# Patient Record
Sex: FEMALE | Race: WHITE | NOT HISPANIC OR LATINO | Employment: OTHER | URBAN - METROPOLITAN AREA
[De-identification: names, ages, dates, MRNs, and addresses within clinical notes are randomized per-mention and may not be internally consistent; named-entity substitution may affect disease eponyms.]

---

## 2017-09-08 ENCOUNTER — DOCUMENTATION ONLY (OUTPATIENT)
Dept: FAMILY MEDICINE | Facility: CLINIC | Age: 37
End: 2017-09-08

## 2017-09-08 NOTE — PROGRESS NOTES
Health Maintenance Due   Topic Date Due    Lipid Panel  1982    TETANUS VACCINE  09/13/2000    Pap Smear with HPV Cotest  09/13/2003    Influenza Vaccine  08/01/2017

## 2017-11-03 RX ORDER — CITALOPRAM 40 MG/1
40 TABLET, FILM COATED ORAL DAILY
COMMUNITY
End: 2018-04-09

## 2017-11-03 RX ORDER — CLONAZEPAM 1 MG/1
1 TABLET ORAL 3 TIMES DAILY
COMMUNITY
End: 2018-04-09

## 2018-04-09 ENCOUNTER — HOSPITAL ENCOUNTER (EMERGENCY)
Facility: HOSPITAL | Age: 38
Discharge: HOME | End: 2018-04-09
Attending: EMERGENCY MEDICINE
Payer: MEDICARE

## 2018-04-09 VITALS
HEART RATE: 90 BPM | RESPIRATION RATE: 18 BRPM | BODY MASS INDEX: 22.62 KG/M2 | OXYGEN SATURATION: 100 % | SYSTOLIC BLOOD PRESSURE: 144 MMHG | WEIGHT: 132.5 LBS | HEIGHT: 64 IN | DIASTOLIC BLOOD PRESSURE: 87 MMHG | TEMPERATURE: 98 F

## 2018-04-09 DIAGNOSIS — R51.9 NONINTRACTABLE HEADACHE, UNSPECIFIED CHRONICITY PATTERN, UNSPECIFIED HEADACHE TYPE: Primary | ICD-10-CM

## 2018-04-09 PROCEDURE — 96361 HYDRATE IV INFUSION ADD-ON: CPT

## 2018-04-09 PROCEDURE — 96365 THER/PROPH/DIAG IV INF INIT: CPT

## 2018-04-09 PROCEDURE — 25000003 PHARM REV CODE 250: Performed by: EMERGENCY MEDICINE

## 2018-04-09 PROCEDURE — 63600175 PHARM REV CODE 636 W HCPCS: Performed by: EMERGENCY MEDICINE

## 2018-04-09 PROCEDURE — 99284 EMERGENCY DEPT VISIT MOD MDM: CPT

## 2018-04-09 PROCEDURE — 96375 TX/PRO/DX INJ NEW DRUG ADDON: CPT

## 2018-04-09 RX ORDER — BUTALBITAL, ACETAMINOPHEN AND CAFFEINE 50; 325; 40 MG/1; MG/1; MG/1
1 TABLET ORAL EVERY 4 HOURS PRN
Qty: 15 TABLET | Refills: 0 | Status: SHIPPED | OUTPATIENT
Start: 2018-04-09 | End: 2018-05-09

## 2018-04-09 RX ORDER — KETOROLAC TROMETHAMINE 30 MG/ML
30 INJECTION, SOLUTION INTRAMUSCULAR; INTRAVENOUS
Status: COMPLETED | OUTPATIENT
Start: 2018-04-09 | End: 2018-04-09

## 2018-04-09 RX ORDER — NAPROXEN 375 MG/1
375 TABLET ORAL 2 TIMES DAILY WITH MEALS
Qty: 30 TABLET | Refills: 0 | Status: SHIPPED | OUTPATIENT
Start: 2018-04-09

## 2018-04-09 RX ORDER — ONDANSETRON 2 MG/ML
4 INJECTION INTRAMUSCULAR; INTRAVENOUS
Status: DISCONTINUED | OUTPATIENT
Start: 2018-04-09 | End: 2018-04-09

## 2018-04-09 RX ADMIN — PROMETHAZINE HYDROCHLORIDE 12.5 MG: 25 INJECTION INTRAMUSCULAR; INTRAVENOUS at 06:04

## 2018-04-09 RX ADMIN — KETOROLAC TROMETHAMINE 30 MG: 30 INJECTION, SOLUTION INTRAMUSCULAR; INTRAVENOUS at 06:04

## 2018-04-09 RX ADMIN — SODIUM CHLORIDE 1000 ML: 0.9 INJECTION, SOLUTION INTRAVENOUS at 06:04

## 2018-04-09 NOTE — ED PROVIDER NOTES
Encounter Date: 4/9/2018       History     Chief Complaint   Patient presents with    Migraine     With nausea and vomiting      The history is provided by the patient.   Migraine   This is a recurrent problem. The current episode started 2 days ago. The problem occurs constantly. The problem has not changed since onset.Associated symptoms include headaches. Pertinent negatives include no chest pain, no abdominal pain and no shortness of breath. Nothing aggravates the symptoms. She has tried nothing for the symptoms.     Review of patient's allergies indicates:   Allergen Reactions    Ativan [lorazepam] Other (See Comments)     Hallucinations    Penicillins Anaphylaxis     Past Medical History:   Diagnosis Date    Asthma     Borderline personality disorder     Depression     PTSD (post-traumatic stress disorder)      Past Surgical History:   Procedure Laterality Date    EYE SURGERY      OOPHORECTOMY Bilateral     OVARIAN CYST REMOVAL      Left ovary removed     Family History   Problem Relation Age of Onset    Cancer Father     Diabetes Father     Hypertension Father     Dementia Maternal Grandmother     Cancer Maternal Grandfather     Cancer Paternal Grandmother     Depression Paternal Grandfather     Diabetes Paternal Grandfather     Hypertension Paternal Grandfather      Social History   Substance Use Topics    Smoking status: Current Every Day Smoker     Packs/day: 0.50    Smokeless tobacco: Never Used    Alcohol use No     Review of Systems   Constitutional: Negative for fever.   HENT: Negative for sore throat.    Respiratory: Negative for shortness of breath.    Cardiovascular: Negative for chest pain.   Gastrointestinal: Negative for abdominal pain and nausea.   Genitourinary: Negative for dysuria.   Musculoskeletal: Negative for back pain.   Skin: Negative for rash.   Neurological: Positive for headaches. Negative for weakness.   Hematological: Does not bruise/bleed easily.        Physical Exam     Initial Vitals [04/09/18 0546]   BP Pulse Resp Temp SpO2   (!) 144/86 82 19 97.6 °F (36.4 °C) 100 %      MAP       105.33         Physical Exam    Nursing note and vitals reviewed.  Constitutional: She appears well-developed and well-nourished. No distress.   HENT:   Head: Normocephalic and atraumatic.   Mouth/Throat: Oropharynx is clear and moist.   Eyes: Conjunctivae and EOM are normal. Pupils are equal, round, and reactive to light.   Neck: Normal range of motion. Neck supple.   Cardiovascular: Normal rate, regular rhythm and normal heart sounds. Exam reveals no gallop and no friction rub.    No murmur heard.  Pulmonary/Chest: Breath sounds normal. No respiratory distress. She has no wheezes. She has no rhonchi. She has no rales.   Abdominal: Soft. Bowel sounds are normal. She exhibits no distension and no mass. There is no tenderness. There is no rebound and no guarding.   Musculoskeletal: Normal range of motion. She exhibits no edema or tenderness.   Neurological: She is alert and oriented to person, place, and time. She has normal strength.   Skin: Skin is warm and dry. No rash noted.   Psychiatric: She has a normal mood and affect. Thought content normal.         ED Course   Procedures  Labs Reviewed - No data to display     6:43 AM: Reassessed pt at this time.  Pt states her condition has improved at this time. Discussed with pt all pertinent ED information and results. Discussed pt dx of headache and plan of tx. Gave pt all f/u and return to the ED instructions. All questions and concerns were addressed at this time. Pt expresses understanding of information and instructions, and is comfortable with plan to discharge. Pt is stable for discharge.                              Clinical Impression:       ICD-10-CM ICD-9-CM   1. Nonintractable headache, unspecified chronicity pattern, unspecified headache type R51 784.0         Disposition:   Disposition: Discharged  Condition:  Stable                        Joel Waston MD  04/09/18 0643

## 2018-07-07 ENCOUNTER — HOSPITAL ENCOUNTER (EMERGENCY)
Facility: HOSPITAL | Age: 38
Discharge: HOME | End: 2018-07-07
Attending: EMERGENCY MEDICINE
Payer: MEDICARE

## 2018-07-07 VITALS
BODY MASS INDEX: 21.46 KG/M2 | RESPIRATION RATE: 18 BRPM | DIASTOLIC BLOOD PRESSURE: 69 MMHG | SYSTOLIC BLOOD PRESSURE: 119 MMHG | HEART RATE: 99 BPM | TEMPERATURE: 98 F | OXYGEN SATURATION: 100 % | WEIGHT: 125 LBS

## 2018-07-07 DIAGNOSIS — K02.9 DENTAL CARIES: Primary | ICD-10-CM

## 2018-07-07 PROCEDURE — 99282 EMERGENCY DEPT VISIT SF MDM: CPT

## 2018-07-07 RX ORDER — CLINDAMYCIN HYDROCHLORIDE 150 MG/1
150 CAPSULE ORAL EVERY 6 HOURS
Qty: 28 CAPSULE | Refills: 0 | Status: SHIPPED | OUTPATIENT
Start: 2018-07-07 | End: 2018-07-14

## 2018-07-07 NOTE — ED NOTES
Denies complaints. Presents to ED for a prescription for Clindamycin. States she is taking the medication in preparation for dental removal. Pt denies complaints.

## 2018-07-07 NOTE — ED PROVIDER NOTES
Encounter Date: 7/7/2018    SCRIBE #1 NOTE: I, Evelin Edwards, am scribing for, and in the presence of, Dr. An.       History     Chief Complaint   Patient presents with    Medication Refill     Visiting from out of town. States she left her abx rx at home. Clindamycin 100mg q 6 hours.        07/07/2018  8:49 AM    Chief Complaint: Medication Refill      The patient is a 37 y.o. female who presents for medication refill. Pt states she is visiting from out of town and forgot her antibiotic at home. She was taking Clindamycin 100 mg every 6 hours for her tooth extraction scheduled 5 days from now. She has not had a reaction to the medication. No exacerbating or alleviating factors. Denies fever. PMHx of Asthma; Borderline personality disorder; Depression; and PTSD. PSHx of Ovarian cyst removal; Eye surgery; and Oophorectomy.      The history is provided by the patient.     Review of patient's allergies indicates:   Allergen Reactions    Ativan [lorazepam] Other (See Comments)     Hallucinations    Penicillins Anaphylaxis     Past Medical History:   Diagnosis Date    Asthma     Borderline personality disorder     Depression     PTSD (post-traumatic stress disorder)      Past Surgical History:   Procedure Laterality Date    EYE SURGERY      OOPHORECTOMY Bilateral     OVARIAN CYST REMOVAL      Left ovary removed     Family History   Problem Relation Age of Onset    Cancer Father     Diabetes Father     Hypertension Father     Dementia Maternal Grandmother     Cancer Maternal Grandfather     Cancer Paternal Grandmother     Depression Paternal Grandfather     Diabetes Paternal Grandfather     Hypertension Paternal Grandfather      Social History   Substance Use Topics    Smoking status: Current Every Day Smoker     Packs/day: 0.50    Smokeless tobacco: Never Used    Alcohol use No     Review of Systems   Constitutional: Negative for fever.   HENT: Positive for dental problem.    Hematological:  Negative for adenopathy.       Physical Exam     Initial Vitals [07/07/18 0829]   BP Pulse Resp Temp SpO2   119/69 99 18 98 °F (36.7 °C) 100 %      MAP       --         Physical Exam    Nursing note and vitals reviewed.  Constitutional: She appears well-developed and well-nourished.   HENT:   Head: Normocephalic and atraumatic.   Mouth/Throat: Dental caries present.   Dental caries left upper.   Eyes: EOM are normal.   Neck: Normal range of motion. Neck supple.   Pulmonary/Chest: No respiratory distress.   Musculoskeletal: Normal range of motion.   Neurological: She is alert and oriented to person, place, and time.   Skin: Skin is warm and dry.   Psychiatric: She has a normal mood and affect. Her behavior is normal. Judgment and thought content normal.         ED Course   Procedures  Labs Reviewed - No data to display       Imaging Results    None          Medical Decision Making:   History:   Old Medical Records: I decided to obtain old medical records.            Scribe Attestation:   Scribe #1: I performed the above scribed service and the documentation accurately describes the services I performed. I attest to the accuracy of the note.    I, Dr. An, personally performed the services described in this documentation. All medical record entries made by the scribe were at my direction and in my presence.  I have reviewed the chart and agree that the record reflects my personal performance and is accurate and complete.11:14 AM 07/07/2018               Clinical Impression:   The encounter diagnosis was Dental caries.      Disposition:   Disposition: Discharged  Condition: Stable         Patient here for refill of clindamycin for her dental caries, she forgot her medication in [place].  No sign of systemic illness.  No abscess on exam.  No fevers.  Prescription for clindamycin was written.               Omer An MD  07/07/18 8214

## 2019-07-19 ENCOUNTER — HOSPITAL ENCOUNTER (EMERGENCY)
Facility: HOSPITAL | Age: 39
Discharge: HOME | End: 2019-07-19
Attending: EMERGENCY MEDICINE
Payer: MEDICARE

## 2019-07-19 VITALS
HEIGHT: 64 IN | BODY MASS INDEX: 22.2 KG/M2 | WEIGHT: 130 LBS | HEART RATE: 99 BPM | SYSTOLIC BLOOD PRESSURE: 120 MMHG | TEMPERATURE: 98 F | DIASTOLIC BLOOD PRESSURE: 72 MMHG | OXYGEN SATURATION: 100 % | RESPIRATION RATE: 16 BRPM
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
## 2019-07-19 LAB
B-HCG UR QL: NEGATIVE
CTP QC/QA: YES

## 2019-07-19 PROCEDURE — 81025 URINE PREGNANCY TEST: CPT | Performed by: NURSE PRACTITIONER

## 2019-07-19 PROCEDURE — 99283 EMERGENCY DEPT VISIT LOW MDM: CPT | Mod: 25

## 2019-07-19 RX ORDER — SULFAMETHOXAZOLE AND TRIMETHOPRIM 800; 160 MG/1; MG/1
2 TABLET ORAL 2 TIMES DAILY
Qty: 14 TABLET | Refills: 0 | Status: SHIPPED | OUTPATIENT
Start: 2019-07-19 | End: 2019-07-26

## 2019-07-19 NOTE — ED PROVIDER NOTES
Encounter Date: 7/19/2019       History     Chief Complaint   Patient presents with    Insect Bite     pt reports insect bite on R elbow with increased reddness today     Patient is a 38 y.o. female who presents to the ED 07/19/2019 with a chief complaint of insect bite that occurred yesterday.  Patient states she did not feel what better.  She is unsure with better.  She states she noted a lesion on her right elbow this morning and she picked it and it drained some clear drainage. She states she noted some worsening redness and presented to the emergency department.  She has a history of PTSD and a single ovary removal.  Otherwise no medical history.  She denies any fever, vomiting, chills.              Review of patient's allergies indicates:   Allergen Reactions    Ativan [lorazepam] Other (See Comments)     Hallucinations    Penicillins Anaphylaxis     Past Medical History:   Diagnosis Date    Asthma     Borderline personality disorder     Depression     PTSD (post-traumatic stress disorder)      Past Surgical History:   Procedure Laterality Date    EYE SURGERY      OOPHORECTOMY Bilateral     OVARIAN CYST REMOVAL      Left ovary removed     Family History   Problem Relation Age of Onset    Cancer Father     Diabetes Father     Hypertension Father     Dementia Maternal Grandmother     Cancer Maternal Grandfather     Cancer Paternal Grandmother     Depression Paternal Grandfather     Diabetes Paternal Grandfather     Hypertension Paternal Grandfather      Social History     Tobacco Use    Smoking status: Current Every Day Smoker     Packs/day: 0.50    Smokeless tobacco: Never Used   Substance Use Topics    Alcohol use: No    Drug use: No     Comment: Per OB THC use, pt denies use on 12/22/14     Review of Systems   Constitutional: Negative for chills and fever.   Respiratory: Negative for chest tightness and shortness of breath.    Cardiovascular: Negative for chest pain.   Gastrointestinal:  Positive for nausea. Negative for abdominal pain and vomiting.   Musculoskeletal: Negative for arthralgias and myalgias.   Skin: Positive for wound. Negative for rash.   Neurological: Negative for weakness and numbness.   Hematological: Negative for adenopathy.       Physical Exam     Initial Vitals [07/19/19 0936]   BP Pulse Resp Temp SpO2   120/72 99 16 98.3 °F (36.8 °C) 100 %      MAP       --         Physical Exam    Nursing note and vitals reviewed.  Constitutional: Vital signs are normal. She appears well-developed and well-nourished.   HENT:   Head: Normocephalic and atraumatic.   Eyes: Pupils are equal, round, and reactive to light.   Neck: Neck supple.   Cardiovascular: Normal rate, regular rhythm, normal heart sounds and intact distal pulses. Exam reveals no gallop and no friction rub.    No murmur heard.  Pulmonary/Chest: Breath sounds normal. She has no wheezes. She has no rhonchi. She has no rales.   Abdominal: Soft. Normal appearance and bowel sounds are normal. There is no tenderness.   Musculoskeletal:        Right elbow: She exhibits swelling. She exhibits normal range of motion, no effusion, no deformity and no laceration. Tenderness found. Olecranon process tenderness noted. No radial head, no medial epicondyle and no lateral epicondyle tenderness noted.        Right wrist: Normal.   Neurological: She is alert and oriented to person, place, and time. She has normal strength.   Skin: Skin is warm, dry and intact.   Patient has small 1 x 1 cm area of erythema with central opening with serous drainage.    Psychiatric: She has a normal mood and affect. Her speech is normal and behavior is normal.             ED Course   Procedures  Labs Reviewed   POCT URINE PREGNANCY          Imaging Results    None          Medical Decision Making:   Differential Diagnosis:   Insect bite  Cellulitis  abscess       APC / Resident Notes:   Patient is a 38 y.o. female who presents to the ED 07/19/2019 who underwent  emergent evaluation to insect bite to right elbow.  Patient does not recall being bitten and does not know what bit her.  Right elbow with a small area of erythema with central serous drainage. I do not think abscess.  Patient has normal range of motion of her elbow and no pain out of proportion.  I do not think septic joint.  There is no eschar tissue or calor.  Her physical exam is otherwise unremarkable and she is very well-appearing.  She does not appear septic or toxic.  Her vital signs are normal. Patient appears to have cellulitis.  I do not think abscess.  The wound site appears to already be draining.  Patient is offered tetanus in the emergency department but leaves prior to getting tetanus immunization.  She is given a prescription for antibiotics and instructed to follow up closely with the PCP for a wound check.  She is given to defer referrals for PCP per her request.  She is instructed she can follow up in the emergency department is unable to make an appointment with PCP for wound check.  Patient verbalized understanding. Based on my clinical evaluation, I do not appreciate any immediate, emergent, or life threatening condition or etiology that warrants additional workup today and feel that the patient can be discharged with close follow up care. Case discussed with Dr. Lombardo who also evaluated patient and  who is agreeable to plan of care. Follow up and return precautions discussed; patient verbalized understanding and is agreeable to plan of care. Patient discharged home in stable condition.                         Clinical Impression:       ICD-10-CM ICD-9-CM   1. Insect bite, initial encounter W57.XXXA 919.4     E906.4         Disposition:   Disposition: Discharged  Condition: Stable                        Eulalia Powers NP  07/19/19 2486

## 2019-08-03 ENCOUNTER — HOSPITAL ENCOUNTER (EMERGENCY)
Facility: HOSPITAL | Age: 39
Discharge: HOME | End: 2019-08-03
Attending: EMERGENCY MEDICINE
Payer: MEDICARE

## 2019-08-03 VITALS
OXYGEN SATURATION: 99 % | DIASTOLIC BLOOD PRESSURE: 65 MMHG | SYSTOLIC BLOOD PRESSURE: 99 MMHG | TEMPERATURE: 99 F | BODY MASS INDEX: 22.2 KG/M2 | WEIGHT: 130 LBS | HEIGHT: 64 IN | HEART RATE: 98 BPM | RESPIRATION RATE: 20 BRPM

## 2019-08-03 DIAGNOSIS — R05.9 COUGH: ICD-10-CM

## 2019-08-03 DIAGNOSIS — J45.901 EXACERBATION OF ASTHMA, UNSPECIFIED ASTHMA SEVERITY, UNSPECIFIED WHETHER PERSISTENT: Primary | ICD-10-CM

## 2019-08-03 PROCEDURE — 63600175 PHARM REV CODE 636 W HCPCS: Performed by: EMERGENCY MEDICINE

## 2019-08-03 PROCEDURE — 99283 EMERGENCY DEPT VISIT LOW MDM: CPT | Mod: 25

## 2019-08-03 PROCEDURE — 94640 AIRWAY INHALATION TREATMENT: CPT

## 2019-08-03 PROCEDURE — 25000242 PHARM REV CODE 250 ALT 637 W/ HCPCS: Performed by: EMERGENCY MEDICINE

## 2019-08-03 RX ORDER — IPRATROPIUM BROMIDE AND ALBUTEROL SULFATE 2.5; .5 MG/3ML; MG/3ML
3 SOLUTION RESPIRATORY (INHALATION)
Status: COMPLETED | OUTPATIENT
Start: 2019-08-03 | End: 2019-08-03

## 2019-08-03 RX ORDER — ALBUTEROL SULFATE 90 UG/1
1-2 AEROSOL, METERED RESPIRATORY (INHALATION) EVERY 6 HOURS PRN
Qty: 1 INHALER | Refills: 0 | Status: SHIPPED | OUTPATIENT
Start: 2019-08-03 | End: 2020-08-02

## 2019-08-03 RX ORDER — ALBUTEROL SULFATE 1.25 MG/3ML
1.25 SOLUTION RESPIRATORY (INHALATION) EVERY 6 HOURS PRN
Qty: 1 BOX | Refills: 1 | Status: SHIPPED | OUTPATIENT
Start: 2019-08-03

## 2019-08-03 RX ORDER — PREDNISONE 20 MG/1
60 TABLET ORAL
Status: COMPLETED | OUTPATIENT
Start: 2019-08-03 | End: 2019-08-03

## 2019-08-03 RX ORDER — ALBUTEROL SULFATE 90 UG/1
1-2 AEROSOL, METERED RESPIRATORY (INHALATION) EVERY 6 HOURS PRN
Qty: 1 INHALER | Refills: 0 | Status: SHIPPED | OUTPATIENT
Start: 2019-08-03 | End: 2019-08-03 | Stop reason: SDUPTHER

## 2019-08-03 RX ORDER — ALBUTEROL SULFATE 90 UG/1
2 AEROSOL, METERED RESPIRATORY (INHALATION) EVERY 6 HOURS PRN
COMMUNITY

## 2019-08-03 RX ORDER — PREDNISONE 50 MG/1
50 TABLET ORAL DAILY
Qty: 3 TABLET | Refills: 0 | Status: SHIPPED | OUTPATIENT
Start: 2019-08-03 | End: 2019-08-06

## 2019-08-03 RX ORDER — ALBUTEROL SULFATE 1.25 MG/3ML
1.25 SOLUTION RESPIRATORY (INHALATION) EVERY 6 HOURS PRN
COMMUNITY
End: 2019-08-03 | Stop reason: SDUPTHER

## 2019-08-03 RX ADMIN — IPRATROPIUM BROMIDE AND ALBUTEROL SULFATE 3 ML: .5; 3 SOLUTION RESPIRATORY (INHALATION) at 08:08

## 2019-08-03 RX ADMIN — PREDNISONE 60 MG: 20 TABLET ORAL at 08:08

## 2019-08-04 NOTE — ED PROVIDER NOTES
Encounter Date: 8/3/2019    SCRIBE #1 NOTE: I, Andrés Zhao Jr., am scribing for, and in the presence of, Dr. Sutherland.       History     Chief Complaint   Patient presents with    Asthma     Out of inhaler and neb treatments       Time seen by provider: 8:47 PM on 08/03/2019    Dominga Finley is a 38 y.o. female with a history of Asthma who presents to the ED with complaints of her Asthma. The patient reports that yesterday she is out of her inhaler and nebula treatments. The patient endorses that she has been SOB and coughing since out of her treatment. No PSHx pertaining to complaint. She is allergic to Ativan and Penicillins.    The history is provided by the patient.     Review of patient's allergies indicates:   Allergen Reactions    Ativan [lorazepam] Other (See Comments)     Hallucinations    Penicillins Anaphylaxis     Past Medical History:   Diagnosis Date    Asthma     Borderline personality disorder     Depression     PTSD (post-traumatic stress disorder)      Past Surgical History:   Procedure Laterality Date    EYE SURGERY      OOPHORECTOMY Bilateral     OVARIAN CYST REMOVAL      Left ovary removed     Family History   Problem Relation Age of Onset    Cancer Father     Diabetes Father     Hypertension Father     Dementia Maternal Grandmother     Cancer Maternal Grandfather     Cancer Paternal Grandmother     Depression Paternal Grandfather     Diabetes Paternal Grandfather     Hypertension Paternal Grandfather      Social History     Tobacco Use    Smoking status: Current Every Day Smoker     Packs/day: 0.50    Smokeless tobacco: Never Used   Substance Use Topics    Alcohol use: No    Drug use: No     Comment: Per OB THC use, pt denies use on 12/22/14     Review of Systems   Constitutional: Negative for activity change, diaphoresis and fever.   HENT: Negative for ear pain, rhinorrhea, sore throat and trouble swallowing.    Eyes: Negative for pain and visual disturbance.    Respiratory: Positive for cough and shortness of breath (due to Asthma). Negative for stridor.    Cardiovascular: Negative for chest pain.   Gastrointestinal: Negative for abdominal pain, blood in stool, diarrhea, nausea and vomiting.   Genitourinary: Negative for dysuria, hematuria, vaginal bleeding and vaginal discharge.   Musculoskeletal: Negative for gait problem.   Skin: Negative for rash.   Neurological: Negative for seizures and headaches.   Psychiatric/Behavioral: Negative for hallucinations and suicidal ideas.       Physical Exam     Initial Vitals [08/03/19 2039]   BP Pulse Resp Temp SpO2   99/65 93 19 98.6 °F (37 °C) 97 %      MAP       --         Physical Exam    Nursing note and vitals reviewed.  Constitutional: She appears well-developed. No distress.   HENT:   Head: Normocephalic and atraumatic.   Nose: Nose normal.   Eyes: EOM are normal.   Neck: Normal range of motion. Neck supple.   Cardiovascular: Normal rate, regular rhythm, normal heart sounds and intact distal pulses. Exam reveals no gallop and no friction rub.    No murmur heard.  Pulmonary/Chest: Breath sounds normal. No stridor. She has no wheezes. She has no rhonchi. She has no rales.   inspiratory and expiratory wheezing. No accessory muscle usage.    Abdominal: Soft. Bowel sounds are normal. There is no tenderness.   Musculoskeletal: Normal range of motion.   Neurological: She is alert and oriented to person, place, and time. No cranial nerve deficit.   Skin: Skin is warm and dry. No rash noted.   Psychiatric: She has a normal mood and affect.         ED Course   Procedures  Labs Reviewed - No data to display       Imaging Results          X-Ray Chest PA And Lateral (In process)                  Medical Decision Making:   History:   Old Medical Records: I decided to obtain old medical records.  Clinical Tests:   Radiological Study: Ordered and Reviewed  ED Management:  37 yo F presents with cough and expiratory wheezing ML 2/2 asthma  exacerbation.  Mild exacerbation: No AMS, silent respirations, belly-breathing, or other sign of impending ventilatory failure.  Patient diagnosed with asthma years prior.  Never intubated or admitted to the hospital for asthma exacerbation.  Unlikely PNA, CHF, COPD (Nonsmoker), FBAO, GERD.    Workup  Defer labs given clinically in exacerbation of known asthma with similar exacerbation presentations per patient. CXR neg.     Therapies:  Steroids  Albuterol  Ipratropium    Reassessment: Patient improved with albuterol and ipratropium in less than 3 hours.    Disposition:  Discharge home with return precautions.  Will refill albuterol inhaler and nebs as pt out of medications.   Advised to follow up with primary care physician within next 24 hours.                Scribe Attestation:   Scribe #1: I performed the above scribed service and the documentation accurately describes the services I performed. I attest to the accuracy of the note.      Attending Attestation:     Physician Attestation for Scribe:    I, Dr. John Sutherland, personally performed the services described in this documentation.   All medical record entries made by the scribe were at my direction and in my presence.   I have reviewed the chart and agree that the record is accurate and complete.   John Sutherland MD  3:29 AM 08/04/2019     DISCLAIMER: This note was prepared with [employer] voice recognition transcription software. Garbled syntax, mangled pronouns, and other bizarre constructions may be attributed to that software system.          ED Course as of Aug 04 0329   Sat Aug 03, 2019   2118 38-year-old female past medical history of borderline personality, PTSD, asthma presents today with asthma exacerbation after running out of her medication yesterday..    [BD]   2154 No acute disease seen, no consolidation, atelectasis or PTX.       [BD]      ED Course User Index  [BD] John Sutherland MD     Clinical Impression:       ICD-10-CM  ICD-9-CM   1. Exacerbation of asthma, unspecified asthma severity, unspecified whether persistent J45.901 493.92   2. Cough R05 786.2         Disposition:   Disposition: Discharged  Condition: Stable                        John Sutherland MD  08/04/19 0324

## 2020-03-17 ENCOUNTER — HOSPITAL ENCOUNTER (EMERGENCY)
Facility: HOSPITAL | Age: 40
Discharge: HOME | End: 2020-03-17
Attending: EMERGENCY MEDICINE
Payer: MEDICARE

## 2020-03-17 VITALS
BODY MASS INDEX: 21.85 KG/M2 | DIASTOLIC BLOOD PRESSURE: 70 MMHG | HEART RATE: 112 BPM | OXYGEN SATURATION: 100 % | RESPIRATION RATE: 20 BRPM | SYSTOLIC BLOOD PRESSURE: 138 MMHG | HEIGHT: 64 IN | TEMPERATURE: 98 F | WEIGHT: 128 LBS

## 2020-03-17 DIAGNOSIS — R11.10 VOMITING, INTRACTABILITY OF VOMITING NOT SPECIFIED, PRESENCE OF NAUSEA NOT SPECIFIED, UNSPECIFIED VOMITING TYPE: Primary | ICD-10-CM

## 2020-03-17 PROCEDURE — 99281 EMR DPT VST MAYX REQ PHY/QHP: CPT

## 2020-03-17 NOTE — ED PROVIDER NOTES
Encounter Date: 3/17/2020    SCRIBE #1 NOTE: I, Mirian Whalen, am scribing for, and in the presence of, Luis Angel Lujan MD.       History     Chief Complaint   Patient presents with    Vomiting     vomited x1 this am / sent from work for return to work        Time seen by provider: 11:54 AM on 03/17/2020    Dominga Finley is a 39 y.o. female with asthma who presents to the ED with an onset of vomiting x1 episode this morning. She reports having an episode of vomiting PTA after taking ibuprofen on an empty stomach while taking Celexa and states she was sent from work for a doctor's note to return to work tomorrow. The patient is a daily smoker and smokes 0.5 ppd. She is not out of her asthma medications. Currently, the patient does not endorse nausea. She denies SOB, abdominal pain, or any other symptoms at this time. No abdominal PSHx.     The history is provided by the patient.     Review of patient's allergies indicates:   Allergen Reactions    Ativan [lorazepam] Other (See Comments)     Hallucinations    Penicillins Anaphylaxis     Past Medical History:   Diagnosis Date    Asthma     Borderline personality disorder     Depression     PTSD (post-traumatic stress disorder)      Past Surgical History:   Procedure Laterality Date    EYE SURGERY      OOPHORECTOMY Bilateral     OVARIAN CYST REMOVAL      Left ovary removed     Family History   Problem Relation Age of Onset    Cancer Father     Diabetes Father     Hypertension Father     Dementia Maternal Grandmother     Cancer Maternal Grandfather     Cancer Paternal Grandmother     Depression Paternal Grandfather     Diabetes Paternal Grandfather     Hypertension Paternal Grandfather      Social History     Tobacco Use    Smoking status: Current Every Day Smoker     Packs/day: 0.50    Smokeless tobacco: Never Used   Substance Use Topics    Alcohol use: No    Drug use: No     Comment: Per OB THC use, pt denies use on 12/22/14     Review of  Systems   Constitutional: Negative for chills and fever.   HENT: Negative for nosebleeds.    Eyes: Negative for visual disturbance.   Respiratory: Negative for cough and shortness of breath.    Cardiovascular: Negative for chest pain and palpitations.   Gastrointestinal: Positive for nausea (resolved) and vomiting (resolved). Negative for abdominal pain and diarrhea.   Genitourinary: Negative for dysuria and hematuria.   Musculoskeletal: Negative for back pain and neck pain.   Skin: Negative for rash.   Neurological: Negative for seizures, syncope and headaches.     Physical Exam     Initial Vitals [03/17/20 1150]   BP Pulse Resp Temp SpO2   138/70 (!) 112 20 98.2 °F (36.8 °C) 100 %      MAP       --         Physical Exam    Nursing note and vitals reviewed.  Constitutional: She appears well-developed and well-nourished. She is not diaphoretic. No distress.   HENT:   Head: Normocephalic and atraumatic.   Eyes: EOM are normal. Pupils are equal, round, and reactive to light.   Neck: Normal range of motion. Neck supple.   Cardiovascular: Normal rate, regular rhythm, normal heart sounds and intact distal pulses. Exam reveals no gallop and no friction rub.    No murmur heard.  Pulmonary/Chest: Breath sounds normal. No respiratory distress. She has no wheezes. She has no rhonchi. She has no rales.   Abdominal: Soft. Bowel sounds are normal. There is no tenderness. There is no rebound and no guarding.   Musculoskeletal: Normal range of motion.   Neurological: She is alert and oriented to person, place, and time.   Skin: Skin is warm and dry.   Psychiatric: She has a normal mood and affect. Her behavior is normal. Judgment and thought content normal.       ED Course   Procedures  Labs Reviewed - No data to display     Imaging Results    None          Medical Decision Making:   History:   Old Medical Records: I decided to obtain old medical records.  Initial Assessment:   39-year-old female presented an episode of  vomiting.  ED Management:  The patient was urgently evaluated in the emergency department, her evaluation was significant for a young female with a benign abdominal exam.  The patient reports taking her home medications on an empty stomach this morning, and believes that vomiting is caused by this.  The patient is asymptomatic at present and denies any further complaints at all.  The patient is stable for discharge to home.  The patient does not want anything for vomiting at home and is just requesting a note to return to work.  The patient was provided with a work note and she is to otherwise follow up with her PCP as needed.            Scribe Attestation:   Scribe #1: I performed the above scribed service and the documentation accurately describes the services I performed. I attest to the accuracy of the note.              I, Dr. Luis Angel Lujan, personally performed the services described in this documentation. All medical record entries made by the scribe were at my direction and in my presence.  I have reviewed the chart and agree that the record reflects my personal performance and is accurate and complete. Luis Angel Lujan MD.  12:14 PM 03/17/2020       Clinical Impression:       ICD-10-CM ICD-9-CM   1. Vomiting, intractability of vomiting not specified, presence of nausea not specified, unspecified vomiting type R11.10 787.03         Disposition:   Disposition: Discharged  Condition: Stable     ED Disposition Condition    Discharge Stable        ED Prescriptions     None        Follow-up Information     Follow up With Specialties Details Why Contact Info    follow up with your PCP   As needed, If symptoms worsen                                      Luis Angel Lujan MD  03/17/20 1216

## 2021-05-06 ENCOUNTER — PATIENT MESSAGE (OUTPATIENT)
Dept: RESEARCH | Facility: HOSPITAL | Age: 41
End: 2021-05-06

## 2021-07-01 ENCOUNTER — PATIENT MESSAGE (OUTPATIENT)
Dept: ADMINISTRATIVE | Facility: OTHER | Age: 41
End: 2021-07-01

## 2022-04-02 ENCOUNTER — TELEPHONE (OUTPATIENT)
Dept: PEDIATRICS | Facility: CLINIC | Age: 42
End: 2022-04-02
Payer: MEDICARE

## 2022-04-02 DIAGNOSIS — Z20.7 EXPOSURE TO SCABIES: Primary | ICD-10-CM

## 2022-04-02 RX ORDER — PERMETHRIN 50 MG/G
CREAM TOPICAL
Qty: 60 G | Refills: 1 | Status: SHIPPED | OUTPATIENT
Start: 2022-04-02 | End: 2022-04-10
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
## 2022-10-03 ENCOUNTER — TELEPHONE (OUTPATIENT)
Dept: SMOKING CESSATION | Facility: CLINIC | Age: 42
End: 2022-10-03
Payer: MEDICARE

## 2022-10-03 NOTE — TELEPHONE ENCOUNTER
Telephoned patient after missed intake.  Patient was unavailable at this time.  I left a detailed message with nature of call and contact information.

## 2022-11-10 ENCOUNTER — TELEPHONE (OUTPATIENT)
Dept: PEDIATRICS | Facility: CLINIC | Age: 42
End: 2022-11-10
Payer: MEDICARE

## 2022-11-10 NOTE — TELEPHONE ENCOUNTER
----- Message from Jeannie Hopkins sent at 11/10/2022  8:52 AM CST -----  Type: Needs Medical Advice  Who Called:  pt  Symptoms (please be specific):  pt wanted to know if she can get her kids shot records off of mychart--please call and advise  Best Call Back Number:[phone]    Additional Information: thank you

## 2023-07-03 ENCOUNTER — HOSPITAL ENCOUNTER (EMERGENCY)
Facility: HOSPITAL | Age: 43
Discharge: HOME | End: 2023-07-03
Attending: EMERGENCY MEDICINE
Payer: MEDICARE

## 2023-07-03 VITALS
RESPIRATION RATE: 18 BRPM | WEIGHT: 143 LBS | HEIGHT: 64 IN | TEMPERATURE: 99 F | SYSTOLIC BLOOD PRESSURE: 113 MMHG | BODY MASS INDEX: 24.41 KG/M2 | HEART RATE: 94 BPM | DIASTOLIC BLOOD PRESSURE: 74 MMHG | OXYGEN SATURATION: 98 %

## 2023-07-03 DIAGNOSIS — M54.42 ACUTE LEFT-SIDED LOW BACK PAIN WITH LEFT-SIDED SCIATICA: Primary | ICD-10-CM

## 2023-07-03 LAB — B-HCG UR QL: NEGATIVE

## 2023-07-03 PROCEDURE — 99284 EMERGENCY DEPT VISIT MOD MDM: CPT

## 2023-07-03 PROCEDURE — 63600175 PHARM REV CODE 636 W HCPCS: Performed by: PHYSICIAN ASSISTANT

## 2023-07-03 PROCEDURE — 96372 THER/PROPH/DIAG INJ SC/IM: CPT | Performed by: PHYSICIAN ASSISTANT

## 2023-07-03 PROCEDURE — 81025 URINE PREGNANCY TEST: CPT | Performed by: PHYSICIAN ASSISTANT

## 2023-07-03 RX ORDER — DEXAMETHASONE SODIUM PHOSPHATE 4 MG/ML
8 INJECTION, SOLUTION INTRA-ARTICULAR; INTRALESIONAL; INTRAMUSCULAR; INTRAVENOUS; SOFT TISSUE
Status: COMPLETED | OUTPATIENT
Start: 2023-07-03 | End: 2023-07-03

## 2023-07-03 RX ORDER — KETOROLAC TROMETHAMINE 30 MG/ML
30 INJECTION, SOLUTION INTRAMUSCULAR; INTRAVENOUS
Status: COMPLETED | OUTPATIENT
Start: 2023-07-03 | End: 2023-07-03

## 2023-07-03 RX ORDER — METAXALONE 800 MG/1
800 TABLET ORAL 3 TIMES DAILY PRN
Qty: 21 TABLET | Refills: 0 | Status: SHIPPED | OUTPATIENT
Start: 2023-07-03 | End: 2023-07-10

## 2023-07-03 RX ORDER — METHYLPREDNISOLONE 4 MG/1
TABLET ORAL
Qty: 21 EACH | Refills: 0 | Status: SHIPPED | OUTPATIENT
Start: 2023-07-03 | End: 2023-07-24

## 2023-07-03 RX ADMIN — KETOROLAC TROMETHAMINE 30 MG: 30 INJECTION, SOLUTION INTRAMUSCULAR; INTRAVENOUS at 02:07

## 2023-07-03 RX ADMIN — DEXAMETHASONE SODIUM PHOSPHATE 8 MG: 4 INJECTION, SOLUTION INTRA-ARTICULAR; INTRALESIONAL; INTRAMUSCULAR; INTRAVENOUS; SOFT TISSUE at 02:07

## 2023-07-03 NOTE — ED PROVIDER NOTES
Encounter Date: 7/3/2023       History     Chief Complaint   Patient presents with    Back Pain    Sciatica     Left side      Dominga Finley, 43 y/o female presents to the ED with worsening low back pain X 3 days. Patient reports chronic back and sciatic pain for 10+ years with bulging discs between T10-T12, and degenerative disc disease. 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 Patient reports severe pain at the lumbar spine starting in the center, radiating to the left side and down into the left thigh. She reports associated tingling down into the toes, and increased pain when laying supine She has tried some left over tramadol from a dental procedure for pain, reports no improvement. She states she was given a steroid taper pack in the past that helped relieve her LBP symptoms. No numbness, saddle anesthesia, loss of bowel or bladder control. Denies N/V, SOB, fever, chills, HA.     The history is provided by the patient.   Review of patient's allergies indicates:   Allergen Reactions    Ativan [lorazepam] Other (See Comments)     Hallucinations    Penicillins Anaphylaxis     Past Medical History:   Diagnosis Date    Asthma     Borderline personality disorder     Depression     PTSD (post-traumatic stress disorder)      Past Surgical History:   Procedure Laterality Date    EYE SURGERY      OOPHORECTOMY Bilateral     OVARIAN CYST REMOVAL      Left ovary removed     Family History   Problem Relation Age of Onset    Cancer Father     Diabetes Father     Hypertension Father     Dementia Maternal Grandmother     Cancer Maternal Grandfather     Cancer Paternal Grandmother     Depression Paternal Grandfather     Diabetes Paternal Grandfather     Hypertension Paternal Grandfather      Social History     Tobacco Use    Smoking status: Every Day     Packs/day: 0.50     Types: Cigarettes    Smokeless tobacco:  Never   Substance Use Topics    Alcohol use: No    Drug use: No     Comment: Per OB THC use, pt denies use on 12/22/14     Review of Systems   Constitutional:  Negative for chills and fever.   Respiratory:  Negative for shortness of breath and wheezing.    Cardiovascular:  Negative for chest pain, palpitations and leg swelling.   Gastrointestinal:  Negative for abdominal pain, constipation, diarrhea, nausea and vomiting.   Genitourinary:  Negative for difficulty urinating, dysuria and pelvic pain.   Musculoskeletal:  Positive for back pain.   Neurological:  Negative for numbness and headaches.   Psychiatric/Behavioral:  Negative for confusion.      Physical Exam     Initial Vitals [07/03/23 1329]   BP Pulse Resp Temp SpO2   113/74 94 18 98.7 °F (37.1 °C) 98 %      MAP       --         Physical Exam    Nursing note and vitals reviewed.  Constitutional: She appears well-developed and well-nourished. She is not diaphoretic. No distress.   HENT:   Head: Normocephalic and atraumatic.   Neck:   Normal range of motion.  Cardiovascular:  Normal rate, regular rhythm and intact distal pulses.           Pulmonary/Chest: Breath sounds normal.   Musculoskeletal:         General: Tenderness present.      Cervical back: Normal range of motion.      Lumbar back: Tenderness present. Decreased range of motion.      Comments: Tenderness to the left, lumbar, paraspinal muscles with no midline tenderness. 5/5 strength to bilateral lower extremities. Able to stand and ambulate with a normal gait.      Neurological: She is alert and oriented to person, place, and time. She has normal strength. No sensory deficit.   Skin: Skin is warm and dry. No rash and no abscess noted. No erythema.   Psychiatric: She has a normal mood and affect.       ED Course   Procedures  Labs Reviewed   PREGNANCY TEST, URINE RAPID    Narrative:     Specimen Source->Urine          Imaging Results    None          Medications   dexAMETHasone injection 8 mg (8 mg  Intramuscular Given 7/3/23 1438)   ketorolac injection 30 mg (30 mg Intramuscular Given 7/3/23 1438)     Medical Decision Making:   History:   Old Medical Records: I decided to obtain old medical records.  Clinical Tests:   Lab Tests: Ordered and Reviewed     APC / Resident Notes:   This is an urgent  evaluation of a well appearing 42 year old with complaint of back pain. Patient reported pain started after lifting a washing machine. Patient denied lower extremity numbness. Patient denied loss of bowel/bladder control. I considered but doubt acute fracture, cauda equina or epidural abscess. Patient is noted to have tenderness to palpation on exam. No bony tenderness or step-off. No fever noted. Patient with normal strength bilaterally to lower extremities. I do not think radiographic imaging is warranted. I will treat their acute pain. Return precautions given.                      Clinical Impression:   Final diagnoses:  [M54.42] Acute left-sided low back pain with left-sided sciatica (Primary)        ED Disposition Condition    Discharge Stable          ED Prescriptions       Medication Sig Dispense Start Date End Date Auth. Provider    methylPREDNISolone (MEDROL DOSEPACK) 4 mg tablet use as directed 21 each 7/3/2023 7/24/2023 Melissa Mann PA-C    metaxalone (SKELAXIN) 800 MG tablet Take 1 tablet (800 mg total) by mouth 3 (three) times daily as needed for Pain. 21 tablet 7/3/2023 7/10/2023 Melissa Mann PA-C          Follow-up Information       Follow up With Specialties Details Why Contact Info Additional Information    [facility] Appointment Line  Schedule an appointment as soon as possible for a visit  For follow up if you don't have a primary care provider [phone]     [facility] - ED Emergency Medicine  As needed [address] [place] [address] Dr Nirav Thibodeaux [id number] 1st floor             Melissa Mann PA-C  07/03/23 6655

## 2023-07-03 NOTE — ED NOTES
Pt reports middle and lower back pain that radiates down left leg. Pt has difficulty getting comfortable when trying to lay and sit.

## 2023-12-15 ENCOUNTER — HOSPITAL ENCOUNTER (EMERGENCY)
Facility: HOSPITAL | Age: 43
Discharge: HOME | End: 2023-12-15
Attending: EMERGENCY MEDICINE
Payer: COMMERCIAL

## 2023-12-15 VITALS
DIASTOLIC BLOOD PRESSURE: 84 MMHG | OXYGEN SATURATION: 100 % | HEART RATE: 94 BPM | TEMPERATURE: 98 F | SYSTOLIC BLOOD PRESSURE: 133 MMHG | RESPIRATION RATE: 22 BRPM | BODY MASS INDEX: 24.89 KG/M2 | WEIGHT: 145 LBS
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
## 2023-12-15 DIAGNOSIS — S80.00XA CONTUSION OF KNEE, UNSPECIFIED LATERALITY, INITIAL ENCOUNTER: ICD-10-CM

## 2023-12-15 DIAGNOSIS — S89.92XA: ICD-10-CM

## 2023-12-15 DIAGNOSIS — M79.604 RIGHT LEG PAIN: ICD-10-CM

## 2023-12-15 DIAGNOSIS — T14.90XA INJURY: ICD-10-CM

## 2023-12-15 DIAGNOSIS — S06.0X1A CONCUSSION WITH LOSS OF CONSCIOUSNESS OF 30 MINUTES OR LESS, INITIAL ENCOUNTER: ICD-10-CM

## 2023-12-15 PROCEDURE — 99284 EMERGENCY DEPT VISIT MOD MDM: CPT | Mod: 25

## 2023-12-15 PROCEDURE — 25000003 PHARM REV CODE 250: Performed by: EMERGENCY MEDICINE

## 2023-12-15 RX ORDER — PROMETHAZINE HYDROCHLORIDE 25 MG/1
25 TABLET ORAL EVERY 6 HOURS PRN
Qty: 15 TABLET | Refills: 0 | Status: SHIPPED | OUTPATIENT
Start: 2023-12-15

## 2023-12-15 RX ORDER — ONDANSETRON 4 MG/1
4 TABLET, ORALLY DISINTEGRATING ORAL
Status: DISCONTINUED | OUTPATIENT
Start: 2023-12-15 | End: 2023-12-15 | Stop reason: HOSPADM

## 2023-12-15 RX ORDER — IBUPROFEN 400 MG/1
800 TABLET ORAL
Status: COMPLETED | OUTPATIENT
Start: 2023-12-15 | End: 2023-12-15

## 2023-12-15 RX ORDER — IBUPROFEN 800 MG/1
800 TABLET ORAL EVERY 8 HOURS PRN
Qty: 30 TABLET | Refills: 0 | Status: SHIPPED | OUTPATIENT
Start: 2023-12-15

## 2023-12-15 RX ORDER — PROMETHAZINE HYDROCHLORIDE 25 MG/1
25 TABLET ORAL
Status: COMPLETED | OUTPATIENT
Start: 2023-12-15 | End: 2023-12-15

## 2023-12-15 RX ADMIN — IBUPROFEN 800 MG: 400 TABLET, FILM COATED ORAL at 03:12

## 2023-12-15 RX ADMIN — PROMETHAZINE HYDROCHLORIDE 25 MG: 25 TABLET ORAL at 03:12
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
## 2025-05-29 ENCOUNTER — OFFICE VISIT (OUTPATIENT)
Dept: UROLOGY | Facility: CLINIC | Age: 45
End: 2025-05-29
Payer: MEDICARE

## 2025-05-29 VITALS — BODY MASS INDEX: 22.7 KG/M2 | HEIGHT: 64 IN | WEIGHT: 132.94 LBS

## 2025-05-29 DIAGNOSIS — O28.0 ABNORMAL QUAD SCREEN: Primary | ICD-10-CM

## 2025-05-29 DIAGNOSIS — R31.0 GROSS HEMATURIA: ICD-10-CM

## 2025-05-29 LAB
BILIRUBIN, UA POC OHS: NEGATIVE
BLOOD, UA POC OHS: ABNORMAL
CLARITY, UA POC OHS: CLEAR
COLOR, UA POC OHS: YELLOW
GLUCOSE, UA POC OHS: NEGATIVE
KETONES, UA POC OHS: NEGATIVE
LEUKOCYTES, UA POC OHS: NEGATIVE
NITRITE, UA POC OHS: NEGATIVE
PH, UA POC OHS: 7
PROTEIN, UA POC OHS: NEGATIVE
SPECIFIC GRAVITY, UA POC OHS: 1.02
UROBILINOGEN, UA POC OHS: 0.2

## 2025-05-29 PROCEDURE — 81003 URINALYSIS AUTO W/O SCOPE: CPT | Mod: PBBFAC,PO

## 2025-05-29 PROCEDURE — 87086 URINE CULTURE/COLONY COUNT: CPT

## 2025-05-29 PROCEDURE — 99203 OFFICE O/P NEW LOW 30 MIN: CPT | Mod: S$PBB,,,

## 2025-05-29 PROCEDURE — 99999 PR PBB SHADOW E&M-EST. PATIENT-LVL III: CPT | Mod: PBBFAC,,,

## 2025-05-29 PROCEDURE — 99999PBSHW POCT URINALYSIS(INSTRUMENT): Mod: PBBFAC,,,

## 2025-05-29 PROCEDURE — 99213 OFFICE O/P EST LOW 20 MIN: CPT | Mod: PBBFAC,PO

## 2025-05-29 RX ORDER — ALBUTEROL SULFATE 0.63 MG/3ML
0.63 SOLUTION RESPIRATORY (INHALATION) EVERY 6 HOURS PRN
COMMUNITY

## 2025-05-29 NOTE — PROGRESS NOTES
[facility] Note  Staff: Angelic Underwood FNP-C    PCP: ALFREDO Dasilva    Chief Complaint:  Gross hematuria    Subjective:        HPI: Dominga Finley is a 44 y.o. female new patient presents today for evaluation of gross hematuria.  She states this been happening intermittently for a few weeks.  She denies symptoms such as dysuria, fever, and difficulty urinating.  She does have a family history of bladder and prostate cancer in her father.  We discussed full workup including imaging and cystoscopy and she would like to proceed.  She denies a history of kidney stones.  She is a current everyday smoker.    Questions asked pt during office visit today:  Urgency:No, incontinence with urgency? No;   DysuriaNo  Gross HematuriaYes   History of UTI: No    History of Kidney Stones?:  no    Constipation issues?:  no    REVIEW OF SYSTEMS:  Review of Systems   Constitutional: Negative.  Negative for chills and fever.   Gastrointestinal: Negative.  Negative for abdominal pain, constipation, diarrhea, nausea and vomiting.   Genitourinary:  Positive for hematuria. Negative for dysuria, flank pain, frequency and urgency.   Musculoskeletal: Negative.  Negative for back pain.       PMHx:  Past Medical History:   Diagnosis Date    Asthma     Borderline personality disorder     Depression     PTSD (post-traumatic stress disorder)        PSHx:  Past Surgical History:   Procedure Laterality Date    EYE SURGERY      OOPHORECTOMY Bilateral     OVARIAN CYST REMOVAL      Left ovary removed       Fam Hx:   malignancies: Yes - father prostate/bladder CA , gyn malignancies: No   kidney stones: No     Soc Hx:  Lives in [place]    Allergies:  Ativan [lorazepam] and Penicillins    Medications: reviewed     Objective:   There were no vitals filed for this visit.    Physical Exam  Constitutional:       Appearance: Normal appearance.   Pulmonary:      Effort: Pulmonary effort is normal.   Abdominal:      General: There  is no distension.      Palpations: Abdomen is soft.      Tenderness: There is no abdominal tenderness.   Musculoskeletal:         General: Normal range of motion.      Cervical back: Normal range of motion.   Skin:     General: Skin is warm.   Neurological:      Mental Status: She is oriented to person, place, and time.   Psychiatric:         Mood and Affect: Mood normal.         Behavior: Behavior normal.         LABS REVIEW:  UA today:   Color:Clear, Yellow  Spec. Grav.  1.020  PH  7.0  Negative for leukocytes, nitrates, protein, glucose, ketones, urobili, bili  Trace blood    Assessment:       1. Abnormal quad screen    2. Gross hematuria          Plan:      Urine sent for culture and cytology  CT urogram ordered and scheduled  Cystoscopy ordered and scheduled    F/u per treatment plan    [facility]: active    HEBERT Harper

## 2025-05-31 LAB — BACTERIA UR CULT: NO GROWTH

## 2025-06-02 ENCOUNTER — TELEPHONE (OUTPATIENT)
Dept: UROLOGY | Facility: CLINIC | Age: 45
End: 2025-06-02
Payer: MEDICARE

## 2025-06-02 ENCOUNTER — RESULTS FOLLOW-UP (OUTPATIENT)
Dept: UROLOGY | Facility: CLINIC | Age: 45
End: 2025-06-02

## 2025-06-10 ENCOUNTER — HOSPITAL ENCOUNTER (OUTPATIENT)
Dept: RADIOLOGY | Facility: HOSPITAL | Age: 45
Discharge: HOME | End: 2025-06-10
Payer: MEDICARE

## 2025-06-10 DIAGNOSIS — R31.0 GROSS HEMATURIA: ICD-10-CM

## 2025-06-10 PROCEDURE — 25500020 PHARM REV CODE 255: Mod: PO

## 2025-06-10 PROCEDURE — 74178 CT ABD&PLV WO CNTR FLWD CNTR: CPT | Mod: TC,PO

## 2025-06-10 PROCEDURE — 74178 CT ABD&PLV WO CNTR FLWD CNTR: CPT | Mod: 26,,, | Performed by: RADIOLOGY

## 2025-06-10 RX ADMIN — IOHEXOL 125 ML: 350 INJECTION, SOLUTION INTRAVENOUS at 03:06

## 2025-06-11 ENCOUNTER — TELEPHONE (OUTPATIENT)
Dept: UROLOGY | Facility: CLINIC | Age: 45
End: 2025-06-11
Payer: MEDICARE

## 2025-06-11 NOTE — TELEPHONE ENCOUNTER
Pt did not answer, left voicemail.    ----- Message from Angelic Underwood NP sent at 6/11/2025 10:42 AM CDT -----  Continue with cystoscopy as scheduled  ----- Message -----  From: Interface, Rad Results In  Sent: 6/10/2025   4:25 PM CDT  To: Angelic Underwood NP

## 2025-06-17 ENCOUNTER — PROCEDURE VISIT (OUTPATIENT)
Dept: UROLOGY | Facility: CLINIC | Age: 45
End: 2025-06-17
Payer: MEDICARE

## 2025-06-17 ENCOUNTER — PATIENT MESSAGE (OUTPATIENT)
Dept: UROLOGY | Facility: CLINIC | Age: 45
End: 2025-06-17

## 2025-06-17 VITALS — WEIGHT: 131.19 LBS | HEIGHT: 64 IN | BODY MASS INDEX: 22.4 KG/M2

## 2025-06-17 DIAGNOSIS — R31.0 GROSS HEMATURIA: ICD-10-CM

## 2025-06-17 DIAGNOSIS — R31.0 GROSS HEMATURIA: Primary | ICD-10-CM

## 2025-06-17 LAB
BILIRUBIN, UA POC OHS: ABNORMAL
BLOOD, UA POC OHS: ABNORMAL
CLARITY, UA POC OHS: CLEAR
COLOR, UA POC OHS: YELLOW
GLUCOSE, UA POC OHS: NEGATIVE
KETONES, UA POC OHS: 80
LEUKOCYTES, UA POC OHS: NEGATIVE
NITRITE, UA POC OHS: NEGATIVE
PH, UA POC OHS: 6
PROTEIN, UA POC OHS: 30
SPECIFIC GRAVITY, UA POC OHS: 1.02
UROBILINOGEN, UA POC OHS: 0.2

## 2025-06-17 PROCEDURE — 81003 URINALYSIS AUTO W/O SCOPE: CPT | Mod: PBBFAC,PO | Performed by: STUDENT IN AN ORGANIZED HEALTH CARE EDUCATION/TRAINING PROGRAM

## 2025-06-17 PROCEDURE — 52000 CYSTOURETHROSCOPY: CPT | Mod: PBBFAC,PO | Performed by: STUDENT IN AN ORGANIZED HEALTH CARE EDUCATION/TRAINING PROGRAM

## 2025-06-17 PROCEDURE — 52000 CYSTOURETHROSCOPY: CPT | Mod: S$PBB,,, | Performed by: STUDENT IN AN ORGANIZED HEALTH CARE EDUCATION/TRAINING PROGRAM

## 2025-06-17 PROCEDURE — 99999PBSHW POCT URINALYSIS(INSTRUMENT): Mod: PBBFAC,,,

## 2025-06-17 NOTE — PROCEDURES
Cystoscopy    Date/Time: 6/17/2025 3:00 PM    Performed by: Getachew Gonzalez MD  Authorized by: Angelic Underwood NP      Procedure:   Flexible cysto-urethroscopy    Pre Procedure Diagnosis:  gross hematuria    Post Procedure Diagnosis:  Same    Surgeon: Getachew Gonzalez MD     Anesthesia: None    Procedure note:  The risks and benefits were explained and informed consent was obtained. The genitalia was prepped and draped in the sterile fashion.    The flexible cystoscope was inserted through the urethra, and this passed easily.   The bladder was completely surveyed in a systematic fashion. The bilateral ureteral orifices were identified in their normal orthotopic locations. There were no foreign bodies, stones, diverticula, or trabeculations. No bladder tumors or lesions suspicious for malignancy were seen.     As the flexible cystoscope was being removed, the urethra was evaluated and showed no abnormalities.    The patient tolerated the procedure well without complication.     Findings in summary:  Normal cystoscopy    Assessment: 44 y.o. female with GH  Normal CT urogram and normal office cystoscopy    Plan:  Follow up with recurrent symptoms, consider nephrology referral if persistent

## 2025-06-19 DIAGNOSIS — R31.9 HEMATURIA SYNDROME: Primary | ICD-10-CM

## 2025-06-23 ENCOUNTER — LAB VISIT (OUTPATIENT)
Dept: LAB | Facility: HOSPITAL | Age: 45
End: 2025-06-23
Attending: INTERNAL MEDICINE
Payer: MEDICARE

## 2025-06-23 DIAGNOSIS — R31.9 HEMATURIA SYNDROME: ICD-10-CM

## 2025-06-23 LAB
ABSOLUTE EOSINOPHIL (OHS): 0.12 K/UL
ABSOLUTE MONOCYTE (OHS): 0.4 K/UL (ref 0.3–1)
ABSOLUTE NEUTROPHIL COUNT (OHS): 6.95 K/UL (ref 1.8–7.7)
ALBUMIN SERPL BCP-MCNC: 4.6 G/DL (ref 3.5–5.2)
ANION GAP (OHS): 10 MMOL/L (ref 8–16)
BACTERIA #/AREA URNS HPF: ABNORMAL /HPF
BASOPHILS # BLD AUTO: 0.04 K/UL
BASOPHILS NFR BLD AUTO: 0.4 %
BILIRUB UR QL STRIP.AUTO: NEGATIVE
BUN SERPL-MCNC: 17 MG/DL (ref 6–20)
CALCIUM SERPL-MCNC: 9.1 MG/DL (ref 8.7–10.5)
CHLORIDE SERPL-SCNC: 102 MMOL/L (ref 95–110)
CLARITY UR: CLEAR
CO2 SERPL-SCNC: 23 MMOL/L (ref 23–29)
COLOR UR AUTO: YELLOW
CREAT SERPL-MCNC: 0.8 MG/DL (ref 0.5–1.4)
CREAT UR-MCNC: 45 MG/DL (ref 15–325)
ERYTHROCYTE [DISTWIDTH] IN BLOOD BY AUTOMATED COUNT: 12.9 % (ref 11.5–14.5)
GFR SERPLBLD CREATININE-BSD FMLA CKD-EPI: >60 ML/MIN/1.73/M2
GLUCOSE SERPL-MCNC: 92 MG/DL (ref 70–110)
GLUCOSE UR QL STRIP: NEGATIVE
HCT VFR BLD AUTO: 45.2 % (ref 37–48.5)
HGB BLD-MCNC: 14.9 GM/DL (ref 12–16)
HGB UR QL STRIP: ABNORMAL
IMM GRANULOCYTES # BLD AUTO: 0.07 K/UL (ref 0–0.04)
IMM GRANULOCYTES NFR BLD AUTO: 0.7 % (ref 0–0.5)
KETONES UR QL STRIP: ABNORMAL
LEUKOCYTE ESTERASE UR QL STRIP: NEGATIVE
LYMPHOCYTES # BLD AUTO: 2.88 K/UL (ref 1–4.8)
MCH RBC QN AUTO: 29.6 PG (ref 27–31)
MCHC RBC AUTO-ENTMCNC: 33 G/DL (ref 32–36)
MCV RBC AUTO: 90 FL (ref 82–98)
MICROSCOPIC COMMENT: ABNORMAL
NITRITE UR QL STRIP: NEGATIVE
NUCLEATED RBC (/100WBC) (OHS): 0 /100 WBC
PH UR STRIP: 6 [PH]
PHOSPHATE SERPL-MCNC: 3.1 MG/DL (ref 2.7–4.5)
PLATELET # BLD AUTO: 272 K/UL (ref 150–450)
PMV BLD AUTO: 10.7 FL (ref 9.2–12.9)
POTASSIUM SERPL-SCNC: 4.4 MMOL/L (ref 3.5–5.1)
PROT UR QL STRIP: NEGATIVE
PROT UR-MCNC: <7 MG/DL
PROT/CREAT UR: NORMAL MG/G{CREAT}
RBC # BLD AUTO: 5.04 M/UL (ref 4–5.4)
RBC #/AREA URNS HPF: 5 /HPF (ref 0–4)
RELATIVE EOSINOPHIL (OHS): 1.1 %
RELATIVE LYMPHOCYTE (OHS): 27.5 % (ref 18–48)
RELATIVE MONOCYTE (OHS): 3.8 % (ref 4–15)
RELATIVE NEUTROPHIL (OHS): 66.5 % (ref 38–73)
SODIUM SERPL-SCNC: 135 MMOL/L (ref 136–145)
SP GR UR STRIP: 1.01
SQUAMOUS #/AREA URNS HPF: 2 /HPF
WBC # BLD AUTO: 10.46 K/UL (ref 3.9–12.7)
WBC #/AREA URNS HPF: 1 /HPF (ref 0–5)

## 2025-06-23 PROCEDURE — 81003 URINALYSIS AUTO W/O SCOPE: CPT | Mod: PO

## 2025-06-23 PROCEDURE — 36415 COLL VENOUS BLD VENIPUNCTURE: CPT | Mod: PO

## 2025-06-23 PROCEDURE — 80069 RENAL FUNCTION PANEL: CPT

## 2025-06-23 PROCEDURE — 85025 COMPLETE CBC W/AUTO DIFF WBC: CPT

## 2025-06-23 PROCEDURE — 84156 ASSAY OF PROTEIN URINE: CPT

## 2025-06-25 ENCOUNTER — OFFICE VISIT (OUTPATIENT)
Dept: NEPHROLOGY | Facility: CLINIC | Age: 45
End: 2025-06-25
Payer: MEDICARE

## 2025-06-25 VITALS
HEIGHT: 64 IN | DIASTOLIC BLOOD PRESSURE: 60 MMHG | BODY MASS INDEX: 21.91 KG/M2 | WEIGHT: 128.31 LBS | HEART RATE: 88 BPM | SYSTOLIC BLOOD PRESSURE: 110 MMHG

## 2025-06-25 DIAGNOSIS — R31.0 GROSS HEMATURIA: ICD-10-CM

## 2025-06-25 PROCEDURE — 99999 PR PBB SHADOW E&M-EST. PATIENT-LVL III: CPT | Mod: PBBFAC,,, | Performed by: INTERNAL MEDICINE

## 2025-06-25 PROCEDURE — 99213 OFFICE O/P EST LOW 20 MIN: CPT | Mod: PBBFAC | Performed by: INTERNAL MEDICINE

## 2025-06-25 PROCEDURE — 99205 OFFICE O/P NEW HI 60 MIN: CPT | Mod: S$PBB,,, | Performed by: INTERNAL MEDICINE

## 2025-06-25 NOTE — PROGRESS NOTES
Dominga Finley is a 44 y.o. female for whom nephrology consult has been requested to evaluate and give opinion.   Renal clinic consultation note:  Date of consultation:  06/25/2025  Reason for consultation:  Hematuria, both gross and microscopic  Referring physician:  Dr. Getachew Gonzalez    HPI: Thank you for referring the pt to us. H/o and chart were reviewed. Pt was seen and examined.  Patient is a 44-year-old  female who was referred to renal clinic for hematuria.  Chart, labs, and medicines reviewed.  Patient currently is feeling well no acute discomfort, no complaints.  Patient reports seeing red or pink urine for the past 2-3 years.  She says that the problems started gradually and more recently she sees more red urine than yellow urine.  He is not taking any NSAIDs consistently.  Patient is not on any blood thinners or platelet inhibitors.  Patient does describe a very active lifestyle, working on a farm and riding horses.  She often jumps with a hoarse, and describes the motion of landing and low back pressure why the horse jumps. Patient has a history of chronic low back pain and issues, for which she took NSAIDs in the past but not more recently, or only as needed.  She applies heat pad the lower back, but not recently.  Patient has received the Urology workup which included CT scan, urogram, and cystoscopy; all of which were unremarkable.  She has no past history of any renal issues or renal dysfunction.    PAST MEDICAL HISTORY: Asthma, Borderline personality disorder, Depression, and PTSD (post-traumatic stress disorder).    PAST SURGICAL HISTORY:  She  has a past surgical history that includes Ovarian cyst removal; Eye surgery; and Oophorectomy (Bilateral).    SOCIAL HISTORY:  She  reports that she has been smoking cigarettes. She has never used smokeless tobacco. She reports that she does not drink alcohol and does not use drugs.    FAMILY MEDICAL HISTORY:  Her family history includes Cancer  "in her father, maternal grandfather, and paternal grandmother; Dementia in her maternal grandmother; Depression in her paternal grandfather; Diabetes in her father and paternal grandfather; Hypertension in her father and paternal grandfather.    Review of patient's allergies indicates:   Allergen Reactions    Ativan [lorazepam] Other (See Comments)     Hallucinations    Penicillins Anaphylaxis           Prior to Admission medications    Medication Sig Start Date End Date Taking? Authorizing Provider   ibuprofen (ADVIL,MOTRIN) 800 MG tablet Take 1 tablet (800 mg total) by mouth every 8 (eight) hours as needed for Pain. 12/15/23  Yes Omer An MD   albuterol (ACCUNEB) 0.63 mg/3 mL Nebu Take 0.63 mg by nebulization every 6 (six) hours as needed. Rescue  Patient not taking: No sig reported    Provider, Zulma   albuterol (ACCUNEB) 1.25 mg/3 mL Nebu Take 3 mLs (1.25 mg total) by nebulization every 6 (six) hours as needed. Rescue  Patient not taking: Reported on 6/25/2025 8/3/19   Jhon Sutherland MD        REVIEW OF SYSTEMS:  Patient has no fever, fatigue, visual changes, chest pain, edema, cough, dyspnea, nausea, vomiting, constipation, diarrhea, arthralgias, pruritis, dizziness, weakness, depression, confusion.    PHYSICAL EXAM:   height is 5' 4" (1.626 m) and weight is 58.2 kg (128 lb 4.9 oz). Her blood pressure is 110/60 and her pulse is 88.   Gen: WDWN female in no apparent distress  Psych: Normal mood and affect  Skin: No rashes or ulcers  Neck: No JVD  Chest: Clear with no rales, rhonchi, wheezing with normal effort  CV: Regular with no murmurs, gallops or rubs  Abd: Soft, nontender, no distension  Ext: No edema    Labs reviewed:  BMP  Lab Results   Component Value Date     (L) 06/23/2025    K 4.4 06/23/2025     06/23/2025    CO2 23 06/23/2025    BUN 17 06/23/2025    CREATININE 0.8 06/23/2025    CALCIUM 9.1 06/23/2025    ANIONGAP 10 06/23/2025    EGFRNORACEVR >60 06/23/2025     Lab Results "   Component Value Date    WBC 10.46 06/23/2025    HGB 14.9 06/23/2025    HCT 45.2 06/23/2025    MCV 90 06/23/2025     06/23/2025     U/a: 1+ blood, no protein, 5 RBC's, no casts reported  Urine pr/cr unremarkable      CT abdomen reviewed May 2025:  There are no renal stones identified. There are no ureteral stones evident. There is no hydronephrosis. The urinary bladder is partially distended, with normal appearance.     CT urogram reviewed May 2025:  Kidneys, ureters, and bladder: The kidneys are normal in size, contour, and position without evidence of hydronephrosis, stones, or solid renal mass.The ureters are normal in caliber and course without evidence of stones.The urinary bladder shows no abnormal mass or abnormal wall thickening.     Cystoscopy reviewed June 2025:  There were no foreign bodies, stones, diverticula, or trabeculations. No bladder tumors or lesions suspicious for malignancy were seen.       IMPRESSION AND RECOMMENDATIONS:  44-year-old  female presented for evaluation of hematuria:    Renal:  Patient has normal kidney function. S Cr is normal and has been stable.  Hematuria has been present for several years.  No proteinuria that is significant.  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    Suspect hematuria  is related to heavy exercise and physical activity  In addition application of heat pad to the lower back may have contributed    DDX:  Certain glomerular conditions can exhibit as hematuria, though often various degrees of proteinuria accompanies hematuria  Therefore differential diagnosis includes thin basement membrane disease, Alport syndrome, and IgA nephropathy    Recommend:  Avoid heavy exercise and strenuous physical activity for about 1-2 months.  Do not ride horses or jump with them for the same length of time  Repeat UA then.  If hematuria continued then a kidney biopsy should be considered        Plans and recommendations:  As detailed above.  Total time spent 60 minutes including time needed to review the records, the   patient evaluation, documentation, face-to-face discussion with the patient,   more than 50% of the time was spent on coordination of care and counseling.    Level V visit.  RTC 2 months    Jeremiah Wing MD

## 2025-07-09 ENCOUNTER — TELEPHONE (OUTPATIENT)
Dept: INFECTIOUS DISEASES | Facility: CLINIC | Age: 45
End: 2025-07-09
Payer: MEDICARE

## 2025-07-09 NOTE — TELEPHONE ENCOUNTER
Copied from CRM #[id number]. Topic: General Inquiry - Patient Advice  >> Jul 9, 2025  8:45 AM Jayde wrote:  Type:  Appointment Request    Caller is requesting a appointment.     Name of Caller:pt     When is the first available appointment?not seen     Would the patient rather a call back or a response via [facility]? [facility] call to schedule     Best Call Back Number: [phone]    Additional Information: pt orders were faxed over by ALFREDO Dasilva and she is ready to schedule her appt pt wants to be seen in [place]    Please call back to advise. Thanks!

## 2025-07-09 NOTE — TELEPHONE ENCOUNTER
Returned call to patient, patient insisting that her NP sent a referral for pt to see Dr. Long for toxoplasmosis.Explained to the patient that the referral received is specifically to Dr. Lucia Madrigal, not Dr. Long. Discussed with patient also that Dr. Long reviews each and every referral prior to scheduling and makes recommendations if he feels ID is not appropriate for a particular case. Unfortunately, Dr. Long is not in the office this week, therefore cannot discuss case with him until his return next week. Also discussed that Dr. Long's schedule is typically on Tuesday and Thursday mornings only, and is booked out about a month in advance. 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 She also states she has a relative who is a doctor in [place] who told her she definitely needs to see Dr. Ethan farfan.  Patient also asked about an open referral to ID as she is [facility]. Advised to discuss with referring NP and then reach out to [facility] when the referral has been placed.  Patient will contact referring NP to send a referral for Dr. Long specifically or an open ID referral

## 2025-08-19 ENCOUNTER — LAB VISIT (OUTPATIENT)
Dept: LAB | Facility: HOSPITAL | Age: 45
End: 2025-08-19
Attending: INTERNAL MEDICINE
Payer: MEDICARE

## 2025-08-19 DIAGNOSIS — R31.0 GROSS HEMATURIA: ICD-10-CM

## 2025-08-19 LAB
ABSOLUTE EOSINOPHIL (OHS): 0.12 K/UL
ABSOLUTE MONOCYTE (OHS): 0.3 K/UL (ref 0.3–1)
ABSOLUTE NEUTROPHIL COUNT (OHS): 6.68 K/UL (ref 1.8–7.7)
ALBUMIN SERPL BCP-MCNC: 4.4 G/DL (ref 3.5–5.2)
ANION GAP (OHS): 13 MMOL/L (ref 8–16)
BACTERIA #/AREA URNS HPF: ABNORMAL /HPF
BASOPHILS # BLD AUTO: 0.03 K/UL
BASOPHILS NFR BLD AUTO: 0.4 %
BILIRUB UR QL STRIP.AUTO: NEGATIVE
BUN SERPL-MCNC: 8 MG/DL (ref 6–20)
C3 SERPL-MCNC: 107 MG/DL (ref 50–180)
C4 COMPLEMENT (OHS): 23 MG/DL (ref 11–44)
CALCIUM SERPL-MCNC: 9.1 MG/DL (ref 8.7–10.5)
CHLORIDE SERPL-SCNC: 101 MMOL/L (ref 95–110)
CLARITY UR: CLEAR
CO2 SERPL-SCNC: 21 MMOL/L (ref 23–29)
COLOR UR AUTO: YELLOW
CREAT SERPL-MCNC: 0.7 MG/DL (ref 0.5–1.4)
ERYTHROCYTE [DISTWIDTH] IN BLOOD BY AUTOMATED COUNT: 13.2 % (ref 11.5–14.5)
GFR SERPLBLD CREATININE-BSD FMLA CKD-EPI: >60 ML/MIN/1.73/M2
GLUCOSE SERPL-MCNC: 114 MG/DL (ref 70–110)
GLUCOSE UR QL STRIP: NEGATIVE
HCT VFR BLD AUTO: 39.1 % (ref 37–48.5)
HGB BLD-MCNC: 13.3 GM/DL (ref 12–16)
HGB UR QL STRIP: ABNORMAL
IMM GRANULOCYTES # BLD AUTO: 0.03 K/UL (ref 0–0.04)
IMM GRANULOCYTES NFR BLD AUTO: 0.4 % (ref 0–0.5)
KETONES UR QL STRIP: NEGATIVE
LEUKOCYTE ESTERASE UR QL STRIP: NEGATIVE
LYMPHOCYTES # BLD AUTO: 0.28 K/UL (ref 1–4.8)
MCH RBC QN AUTO: 30.2 PG (ref 27–31)
MCHC RBC AUTO-ENTMCNC: 34 G/DL (ref 32–36)
MCV RBC AUTO: 89 FL (ref 82–98)
MICROSCOPIC COMMENT: ABNORMAL
NITRITE UR QL STRIP: NEGATIVE
NUCLEATED RBC (/100WBC) (OHS): 0 /100 WBC
PH UR STRIP: 7 [PH]
PHOSPHATE SERPL-MCNC: 3 MG/DL (ref 2.7–4.5)
PLATELET # BLD AUTO: 230 K/UL (ref 150–450)
PMV BLD AUTO: 11 FL (ref 9.2–12.9)
POTASSIUM SERPL-SCNC: 3.7 MMOL/L (ref 3.5–5.1)
PROT UR QL STRIP: NEGATIVE
RBC # BLD AUTO: 4.41 M/UL (ref 4–5.4)
RBC #/AREA URNS HPF: 5 /HPF (ref 0–4)
RELATIVE EOSINOPHIL (OHS): 1.6 %
RELATIVE LYMPHOCYTE (OHS): 3.8 % (ref 18–48)
RELATIVE MONOCYTE (OHS): 4 % (ref 4–15)
RELATIVE NEUTROPHIL (OHS): 89.8 % (ref 38–73)
SODIUM SERPL-SCNC: 135 MMOL/L (ref 136–145)
SP GR UR STRIP: 1.01
SQUAMOUS #/AREA URNS HPF: 1 /HPF
WBC # BLD AUTO: 7.44 K/UL (ref 3.9–12.7)
WBC #/AREA URNS HPF: 1 /HPF (ref 0–5)

## 2025-08-19 PROCEDURE — 86038 ANTINUCLEAR ANTIBODIES: CPT

## 2025-08-19 PROCEDURE — 86036 ANCA SCREEN EACH ANTIBODY: CPT

## 2025-08-19 PROCEDURE — 82040 ASSAY OF SERUM ALBUMIN: CPT

## 2025-08-19 PROCEDURE — 86160 COMPLEMENT ANTIGEN: CPT

## 2025-08-19 PROCEDURE — 85025 COMPLETE CBC W/AUTO DIFF WBC: CPT

## 2025-08-19 PROCEDURE — 81003 URINALYSIS AUTO W/O SCOPE: CPT | Mod: PO

## 2025-08-19 PROCEDURE — 86160 COMPLEMENT ANTIGEN: CPT | Mod: 59

## 2025-08-19 PROCEDURE — 36415 COLL VENOUS BLD VENIPUNCTURE: CPT | Mod: PO

## 2025-08-19 PROCEDURE — 83516 IMMUNOASSAY NONANTIBODY: CPT

## 2025-08-20 LAB — ANA (OHS): NORMAL

## 2025-08-21 LAB — PROTEINASE3 IGG SERPL IA-ACNC: 0.2 U

## 2025-08-22 LAB
W C-ANCA: NORMAL TITER
W MYELOPEROXIDASE (MPO) AB: <0.2 U/ML
W P-ANCA: NORMAL TITER

## 2025-08-26 ENCOUNTER — OFFICE VISIT (OUTPATIENT)
Dept: NEPHROLOGY | Facility: CLINIC | Age: 45
End: 2025-08-26
Payer: MEDICARE

## 2025-08-26 VITALS
HEIGHT: 64 IN | RESPIRATION RATE: 18 BRPM | SYSTOLIC BLOOD PRESSURE: 110 MMHG | HEART RATE: 93 BPM | DIASTOLIC BLOOD PRESSURE: 70 MMHG | BODY MASS INDEX: 21.22 KG/M2 | WEIGHT: 124.31 LBS

## 2025-08-26 DIAGNOSIS — R31.9 HEMATURIA SYNDROME: Primary | ICD-10-CM

## 2025-08-26 PROCEDURE — 99213 OFFICE O/P EST LOW 20 MIN: CPT | Mod: PBBFAC | Performed by: INTERNAL MEDICINE

## 2025-08-26 PROCEDURE — 99999 PR PBB SHADOW E&M-EST. PATIENT-LVL III: CPT | Mod: PBBFAC,,, | Performed by: INTERNAL MEDICINE

## 2025-08-26 PROCEDURE — 99215 OFFICE O/P EST HI 40 MIN: CPT | Mod: S$PBB,,, | Performed by: INTERNAL MEDICINE
